# Patient Record
(demographics unavailable — no encounter records)

---

## 2025-02-07 NOTE — PLAN
[FreeTextEntry1] :  48 year old  female presents for annual.  -Pap/HPV -Referred for mammo -Discussed perimenopause -Advised colonoscopy, referred to GI  F/u in 1 yr.

## 2025-02-07 NOTE — HISTORY OF PRESENT ILLNESS
[FreeTextEntry1] :  48 year old  female presents for annual. Last pap was in . Has 1 prior c/s in 2012. Reports menstrual cycles have gotten shorter - now average of 26 days. Has breast discomfort prior to menses. Has never had a mammo. States mother had pancreatic cancer and father had prostate cancer. Notes daughter is 12 yrs old. Pt works as a 's aide in [place]   OBHx: c/s x1()  PSHx: c/s  FHx: pancreatic cancer-mother, prostate cancer-father SocHx: works as 's aide, daughter is 12  Allergies: NKDA

## 2025-02-07 NOTE — END OF VISIT
[FreeTextEntry3] :  I, Rimma Sidhu, acted as a scribe on behalf of Dr. Grace Prasad M.D. on 02/07/2025.   All medical entries made by the scribe were at my, Dr. Grace Prasad M.D., direction and personally dictated by me on 02/07/2025. I have reviewed the chart and agree that the record accurately reflects my personal performance of the history, physical exam, assessment and plan. I have also personally directed, reviewed, and agreed with the chart.